# Patient Record
Sex: FEMALE | URBAN - METROPOLITAN AREA
[De-identification: names, ages, dates, MRNs, and addresses within clinical notes are randomized per-mention and may not be internally consistent; named-entity substitution may affect disease eponyms.]

---

## 2020-08-14 ENCOUNTER — AMBULATORY - HEALTHEAST (OUTPATIENT)
Dept: CARE COORDINATION | Facility: CLINIC | Age: 45
End: 2020-08-14

## 2020-08-14 ENCOUNTER — COMMUNICATION - HEALTHEAST (OUTPATIENT)
Dept: CARE COORDINATION | Facility: CLINIC | Age: 45
End: 2020-08-14

## 2020-08-14 DIAGNOSIS — U07.1 2019 NOVEL CORONAVIRUS DISEASE (COVID-19): ICD-10-CM

## 2021-05-28 ENCOUNTER — RECORDS - HEALTHEAST (OUTPATIENT)
Dept: ADMINISTRATIVE | Facility: CLINIC | Age: 46
End: 2021-05-28

## 2021-05-30 ENCOUNTER — RECORDS - HEALTHEAST (OUTPATIENT)
Dept: ADMINISTRATIVE | Facility: CLINIC | Age: 46
End: 2021-05-30

## 2021-06-02 ENCOUNTER — RECORDS - HEALTHEAST (OUTPATIENT)
Dept: ADMINISTRATIVE | Facility: CLINIC | Age: 46
End: 2021-06-02

## 2021-06-16 PROBLEM — J96.01 ACUTE RESPIRATORY FAILURE WITH HYPOXIA (H): Status: ACTIVE | Noted: 2020-08-09

## 2021-06-16 PROBLEM — U07.1 COVID-19 VIRUS INFECTION: Status: ACTIVE | Noted: 2020-08-09

## 2021-06-29 NOTE — PROGRESS NOTES
Progress Notes by Simona Navarro RN at 8/14/2020  2:35 PM     Author: Siomna Navarro RN Service: -- Author Type: Registered Nurse    Filed: 8/14/2020  2:51 PM Encounter Date: 8/14/2020 Status: Signed    : Simona Navarro RN (Registered Nurse)       Clinic Care Coordination Contact    Clinic Care Coordination Contact  OUTREACH    Referral Information:  Referral Source: IP Handoff         Chief Complaint   Patient presents with   ? Clinic Care Coordination - Initial        Universal Utilization:      Utilization    Last refreshed: 8/14/2020  9:46 AM:  Hospital Admissions 2           Last refreshed: 8/14/2020  9:46 AM:  ED Visits 1           Last refreshed: 8/14/2020  9:46 AM:  No Show Count (past year) 0              Current as of: 8/14/2020  9:46 AM              Clinical Concerns:  Current Medical Concerns:  Pt recently discharged from [place] - No PCP listed on discharge summary    Patient Active Problem List   Diagnosis   ? Primary Hypothyroidism   ? Pneumonia due to 2019 novel coronavirus   ? Hypoxia   ? Elevated glucose   ? Gastroesophageal reflux disease without esophagitis   ? Infection due to 2019 novel coronavirus   ? Acute respiratory failure with hypoxia (H)   ? COVID-19 virus infection              Pt notes that she does have a PCP at the [facility]. She will reach out to schedule her recommended follow up visist.  Reviewed discharge instructions and discussed self isolation recommendations     Patient verbalized understanding via interpretive services. Engaged in [employer] communication during visit              Plan:   Pt to schedule and attend follow up visit with PCP  No further outreach at this time